# Patient Record
(demographics unavailable — no encounter records)

---

## 2024-10-10 NOTE — REASON FOR VISIT
[Follow-Up] : a follow-up evaluation of [[employer] ] : provided by [employer]   [Time Spent: ____ minutes] : Total time spent using  services: [unfilled] minutes. The patient's primary language is not English thus required  services. [Interpreters_IDNumber] : [id number] [TWNoteComboBox1] : [place]ian

## 2024-10-10 NOTE — PHYSICAL EXAM
[Labia Majora] : normal [Labia Minora] : normal [Pink Rugae] : pink rugae [No Bleeding] : There was no active vaginal bleeding [Normal] : normal [Normal Position] : in a normal position [Tenderness] : nontender [Uterine Adnexae] : normal

## 2024-10-10 NOTE — HISTORY OF PRESENT ILLNESS
[FreeTextEntry1] : 29yo  (LMP ?  presents for confirmation of pregnancy.  Pt was taking OCPs- not very compliant.  Unsure of her feelings about this pregnancy.  Undecided    Denies VB/Cramping.  Mild nausea.   - Pap  neg/ HPV neg

## 2024-10-10 NOTE — DISCUSSION/SUMMARY
[FreeTextEntry1] : 29yo -  here for confirmation of pregnancy. Undecided re: plan for continuing/ termination  - Viable IUP +FH  CRL ~7.1wks. - discussed pt options at length- pt to decide and schedule PCAP at 10 wks if desires continuation, or Family planning visit asap.   - [ ]T&S, CBC sent - encouraged PNV - [ ]pap/ gc/ct sent  f/u based on pt decision  Prasanth Grossman PAC

## 2024-12-19 NOTE — PLAN
[FreeTextEntry1] : 32 y/o  at 17w1d presenting for colposcopy for LGSIL HPV+ pap smear.  #Colposcopy -  pap smear: LGSIL HPV+ (genotype not 16/18/45) -  pap smear: NILM HPV - Acetowhite change noted today consistent with pap smear, given pregnancy defer biopsy - Recommend repeat pap smear postpartum  Patient seen and discussed with attg Dr Nicolas Taylor MD PGY4

## 2024-12-19 NOTE — PROCEDURE
[Colposcopy] : Colposcopy  [Risks] : risks [Benefits] : benefits [Alternatives] : alternatives [Infection] : infection [Bleeding] : bleeding [LGSIL] : LGSIL [HPV High Risk] : HPV high risk [Colposcopy Adequate] : colposcopy adequate [SCI Fully Visualized] : SCI fully visualized [No Abnormalities] : no abnormalities [ECC Performed] : ECC not performed [Biopsy] : biopsy not taken [de-identified] : Acetowhite changes at 12 o'clock consistent with LGSIL pap smear [de-identified] : Acetowhite changes at 12 o'clock consistent with LGSIL pap smear,

## 2024-12-19 NOTE — PROCEDURE
[Colposcopy] : Colposcopy  [Risks] : risks [Benefits] : benefits [Alternatives] : alternatives [Infection] : infection [Bleeding] : bleeding [LGSIL] : LGSIL [HPV High Risk] : HPV high risk [Colposcopy Adequate] : colposcopy adequate [SCI Fully Visualized] : SCI fully visualized [No Abnormalities] : no abnormalities [ECC Performed] : ECC not performed [Biopsy] : biopsy not taken [de-identified] : Acetowhite changes at 12 o'clock consistent with LGSIL pap smear [de-identified] : Acetowhite changes at 12 o'clock consistent with LGSIL pap smear,

## 2024-12-19 NOTE — PLAN
[FreeTextEntry1] : 30 y/o  at 17w1d presenting for colposcopy for LGSIL HPV+ pap smear.  #Colposcopy -  pap smear: LGSIL HPV+ (genotype not 16/18/45) -  pap smear: NILM HPV - Acetowhite change noted today consistent with pap smear, given pregnancy defer biopsy - Recommend repeat pap smear postpartum  Patient seen and discussed with attg Dr Nicolas Taylor MD PGY4

## 2025-06-23 NOTE — HISTORY OF PRESENT ILLNESS
[Postpartum Follow Up] : postpartum follow up [Complications:___] : no complications [Last Pap Date: ___] : Last Pap Date: [unfilled] [Delivery Date: ___] : on [unfilled] [] : delivered by vaginal delivery [Male] : Delivery History: baby boy [Wt. ___] : weighing [unfilled] [Breastfeeding] : currently nursing [None] : no vaginal bleeding [Normal] : the vagina was normal [Healing Well] : is healing well [Cervix Sample Taken] : cervical sample taken for a Pap smear [Examination Of The Breasts] : breasts are normal [FreeTextEntry8] : 31ti  s/p   presents for pp exam.  Breast and bottle feeding/ Desires to restart Sprintec.  Denies pp depression.   [de-identified] : 32yo P4 s/p -  for pp exam [de-identified] : [ ]PP PAP collected- follow up based on result.   Sprintec rx sent.  RTC for janeth/prn or if pap abnl.  Yesi, PAC